# Patient Record
Sex: MALE | Race: WHITE | URBAN - METROPOLITAN AREA
[De-identification: names, ages, dates, MRNs, and addresses within clinical notes are randomized per-mention and may not be internally consistent; named-entity substitution may affect disease eponyms.]

---

## 2017-01-26 ENCOUNTER — OFFICE VISIT (OUTPATIENT)
Dept: ORTHOPEDIC SURGERY | Age: 42
End: 2017-01-26

## 2017-01-26 DIAGNOSIS — M25.532 WRIST PAIN, ACUTE, LEFT: Primary | ICD-10-CM

## 2017-01-26 PROCEDURE — 99213 OFFICE O/P EST LOW 20 MIN: CPT | Performed by: ORTHOPAEDIC SURGERY

## 2017-02-08 PROBLEM — M25.539 WRIST PAIN, ACUTE: Status: ACTIVE | Noted: 2017-02-08

## 2017-03-06 ENCOUNTER — TELEPHONE (OUTPATIENT)
Dept: ORTHOPEDIC SURGERY | Age: 42
End: 2017-03-06

## 2017-03-13 ENCOUNTER — OFFICE VISIT (OUTPATIENT)
Dept: ORTHOPEDIC SURGERY | Age: 42
End: 2017-03-13

## 2017-03-13 VITALS — HEIGHT: 70 IN | WEIGHT: 164.02 LBS | BODY MASS INDEX: 23.48 KG/M2

## 2017-03-13 DIAGNOSIS — M25.532 WRIST PAIN, ACUTE, LEFT: Primary | ICD-10-CM

## 2017-03-13 PROCEDURE — 99213 OFFICE O/P EST LOW 20 MIN: CPT | Performed by: ORTHOPAEDIC SURGERY

## 2017-04-05 ENCOUNTER — OFFICE VISIT (OUTPATIENT)
Dept: ORTHOPEDIC SURGERY | Age: 42
End: 2017-04-05

## 2017-04-05 VITALS
SYSTOLIC BLOOD PRESSURE: 108 MMHG | HEIGHT: 70 IN | DIASTOLIC BLOOD PRESSURE: 75 MMHG | HEART RATE: 75 BPM | BODY MASS INDEX: 23.48 KG/M2 | WEIGHT: 164 LBS

## 2017-04-05 DIAGNOSIS — M75.82 ROTATOR CUFF TENDONITIS, LEFT: ICD-10-CM

## 2017-04-05 DIAGNOSIS — M25.512 LEFT SHOULDER PAIN, UNSPECIFIED CHRONICITY: Primary | ICD-10-CM

## 2017-04-05 PROCEDURE — 20610 DRAIN/INJ JOINT/BURSA W/O US: CPT | Performed by: ORTHOPAEDIC SURGERY

## 2017-04-05 PROCEDURE — 99214 OFFICE O/P EST MOD 30 MIN: CPT | Performed by: ORTHOPAEDIC SURGERY

## 2017-04-05 RX ORDER — WITCH HAZEL 50 %
500 PADS, MEDICATED (EA) TOPICAL 3 TIMES DAILY
COMMUNITY

## 2017-04-05 RX ORDER — ACETAMINOPHEN 160 MG
TABLET,DISINTEGRATING ORAL
COMMUNITY

## 2017-04-19 ENCOUNTER — HOSPITAL ENCOUNTER (OUTPATIENT)
Dept: PHYSICAL THERAPY | Age: 42
Discharge: HOME | End: 2017-04-30
Admitting: ORTHOPAEDIC SURGERY

## 2017-05-03 ENCOUNTER — HOSPITAL ENCOUNTER (OUTPATIENT)
Dept: PHYSICAL THERAPY | Age: 42
Discharge: HOME | End: 2017-05-03
Admitting: ORTHOPAEDIC SURGERY

## 2017-05-10 ENCOUNTER — OFFICE VISIT (OUTPATIENT)
Dept: ORTHOPEDIC SURGERY | Age: 42
End: 2017-05-10

## 2017-05-10 VITALS
WEIGHT: 166 LBS | DIASTOLIC BLOOD PRESSURE: 85 MMHG | HEIGHT: 71 IN | HEART RATE: 65 BPM | SYSTOLIC BLOOD PRESSURE: 130 MMHG | BODY MASS INDEX: 23.24 KG/M2

## 2017-05-10 DIAGNOSIS — M75.82 ROTATOR CUFF TENDONITIS, LEFT: Primary | ICD-10-CM

## 2017-05-10 PROCEDURE — 99213 OFFICE O/P EST LOW 20 MIN: CPT | Performed by: ORTHOPAEDIC SURGERY

## 2017-05-22 ENCOUNTER — OFFICE VISIT (OUTPATIENT)
Dept: FAMILY MEDICINE CLINIC | Age: 42
End: 2017-05-22

## 2017-05-22 VITALS
OXYGEN SATURATION: 96 % | TEMPERATURE: 97.4 F | WEIGHT: 167.8 LBS | SYSTOLIC BLOOD PRESSURE: 112 MMHG | HEART RATE: 66 BPM | BODY MASS INDEX: 23.4 KG/M2 | DIASTOLIC BLOOD PRESSURE: 73 MMHG

## 2017-05-22 DIAGNOSIS — H81.11 BPPV (BENIGN PAROXYSMAL POSITIONAL VERTIGO), RIGHT: Primary | ICD-10-CM

## 2017-05-22 PROBLEM — H81.10 BPPV (BENIGN PAROXYSMAL POSITIONAL VERTIGO): Status: ACTIVE | Noted: 2017-05-22

## 2017-05-22 PROCEDURE — 99213 OFFICE O/P EST LOW 20 MIN: CPT | Performed by: FAMILY MEDICINE

## 2017-05-22 RX ORDER — MECLIZINE HYDROCHLORIDE 25 MG/1
TABLET ORAL
Qty: 24 TABLET | Refills: 0 | Status: SHIPPED | OUTPATIENT
Start: 2017-05-22 | End: 2017-08-31 | Stop reason: ALTCHOICE

## 2017-05-30 ENCOUNTER — TELEPHONE (OUTPATIENT)
Dept: FAMILY MEDICINE CLINIC | Age: 42
End: 2017-05-30

## 2017-07-28 ENCOUNTER — TELEPHONE (OUTPATIENT)
Dept: ORTHOPEDIC SURGERY | Age: 42
End: 2017-07-28

## 2017-08-28 ENCOUNTER — OFFICE VISIT (OUTPATIENT)
Dept: ORTHOPEDIC SURGERY | Age: 42
End: 2017-08-28

## 2017-08-28 VITALS — BODY MASS INDEX: 23.49 KG/M2 | WEIGHT: 167.77 LBS | HEIGHT: 71 IN

## 2017-08-28 DIAGNOSIS — M25.532 LEFT WRIST PAIN: Primary | ICD-10-CM

## 2017-08-28 PROCEDURE — 99213 OFFICE O/P EST LOW 20 MIN: CPT | Performed by: ORTHOPAEDIC SURGERY

## 2017-08-30 ENCOUNTER — TELEPHONE (OUTPATIENT)
Dept: ORTHOPEDIC SURGERY | Age: 42
End: 2017-08-30

## 2017-08-31 ENCOUNTER — OFFICE VISIT (OUTPATIENT)
Dept: FAMILY MEDICINE CLINIC | Age: 42
End: 2017-08-31

## 2017-08-31 VITALS
SYSTOLIC BLOOD PRESSURE: 125 MMHG | TEMPERATURE: 96.3 F | RESPIRATION RATE: 20 BRPM | WEIGHT: 171.5 LBS | OXYGEN SATURATION: 97 % | HEART RATE: 68 BPM | DIASTOLIC BLOOD PRESSURE: 77 MMHG | BODY MASS INDEX: 24.01 KG/M2

## 2017-08-31 DIAGNOSIS — S16.1XXA NECK STRAIN, INITIAL ENCOUNTER: ICD-10-CM

## 2017-08-31 DIAGNOSIS — M79.18 RHOMBOID MUSCLE PAIN: Primary | ICD-10-CM

## 2017-08-31 PROCEDURE — 99213 OFFICE O/P EST LOW 20 MIN: CPT | Performed by: FAMILY MEDICINE

## 2017-08-31 RX ORDER — CHOLECALCIFEROL (VITAMIN D3) 125 MCG
5 CAPSULE ORAL NIGHTLY
COMMUNITY

## 2017-08-31 RX ORDER — METHOCARBAMOL 500 MG/1
500 TABLET, FILM COATED ORAL 3 TIMES DAILY PRN
Qty: 30 TABLET | Refills: 0 | Status: SHIPPED | OUTPATIENT
Start: 2017-08-31 | End: 2017-09-10

## 2017-08-31 RX ORDER — METHYLPREDNISOLONE 4 MG/1
TABLET ORAL
Qty: 1 KIT | Refills: 0 | Status: SHIPPED | OUTPATIENT
Start: 2017-08-31 | End: 2017-09-06

## 2017-09-25 ENCOUNTER — OFFICE VISIT (OUTPATIENT)
Dept: ORTHOPEDIC SURGERY | Age: 42
End: 2017-09-25

## 2017-09-25 VITALS — BODY MASS INDEX: 24.01 KG/M2 | WEIGHT: 171.52 LBS | HEIGHT: 71 IN

## 2017-09-25 DIAGNOSIS — M77.8 LEFT WRIST TENDONITIS: ICD-10-CM

## 2017-09-25 DIAGNOSIS — M25.832 ULNAR ABUTMENT SYNDROME, LEFT: Primary | ICD-10-CM

## 2017-09-25 PROBLEM — M25.839: Status: ACTIVE | Noted: 2017-09-25

## 2017-09-25 PROCEDURE — 73120 X-RAY EXAM OF HAND: CPT | Performed by: ORTHOPAEDIC SURGERY

## 2017-09-25 PROCEDURE — 99213 OFFICE O/P EST LOW 20 MIN: CPT | Performed by: ORTHOPAEDIC SURGERY

## 2017-09-25 NOTE — MR AVS SNAPSHOT
After Visit Summary             Dudley Frias   2017 10:45 AM   Office Visit    Description:  Male : 1975   Provider:  Desire Bell MD   Department[facility]              Your Follow-Up and Future Appointments         Below is a list of your follow-up and future appointments. This may not be a complete list as you may have made appointments directly with providers that we are not aware of or your providers may have made some for you. Please call your providers to confirm appointments. It is important to keep your appointments. Please bring your current insurance card, photo ID, co-pay, and all medication bottles to your appointment. If self-pay, payment is expected at the time of service.            Information from Your Visit        Department     Name Address Phone Fax    [facility] [address]  [address] [facility] [address] [facility] [address] [place] [address] [phone]      You Were Seen for:         Comments    Ulnar abutment syndrome, left   [[id number]]         Vital Signs     Height Weight Body Mass Index Smoking Status          5' 10.87\" (1.8 m) 171 lb 8.3 oz (77.8 kg) 24.01 kg/m2 Former Smoker           Medications and Orders      Your Current Medications Are              melatonin 5 MG TABS tablet Take 5 mg by mouth nightly    Cholecalciferol (VITAMIN D3) 2000 UNITS CAPS Take by mouth    CLOMIPHENE CITRATE PO Take 25 mg by mouth daily    LevOCARNitine (L-CARNITINE) 500 MG TABS Take 500 mg by mouth 3 times daily    Testosterone 2 MG/24HR PT24 Place 2 mg onto the skin    ibuprofen (ADVIL;MOTRIN) 800 MG tablet Take 1 tablet by mouth every 8 hours as needed for Pain    fluticasone (FLONASE) 50 MCG/ACT nasal spray 1 spray by Nasal route daily      Allergies           No Known Allergies      We Ordered/Performed the following           XR HAND LEFT (2 VIEWS)     Comments:    [id number]    XR HAND RIGHT (2 VIEWS)     Comments:    [id number] with their own [employer] username and password and access your record to view the After Visit Summary. Additional Information  If you have questions, please contact the physician practice where you receive care. Remember, [employer] is NOT to be used for urgent needs. For medical emergencies, dial 911. For questions regarding your [employer] account call [phone]. If you have a clinical question, please call your doctor's office.

## 2017-09-27 ENCOUNTER — TELEPHONE (OUTPATIENT)
Dept: ORTHOPEDIC SURGERY | Age: 42
End: 2017-09-27

## 2017-10-15 NOTE — PROGRESS NOTES
and no ROM of other digits/hand There is not clinical evidence of skeletal deformity or mal-rotation.      Minimal tenderness along the ECU tendon sheath   Nontender throughout remainder of left wrist exam    Mild pain with ulnar deviation of wrist noted    Negative ECU Synergy test, pain with supination and wrist hyperextension along ECU tendon sheath     contralateral wrist : normal ROM without pain. No pain on palpation. No swelling. Normal strength.         Negative Tinel's left carpal tunnel and left cubital tunnel    Capillary refill brisk all fingers, symmetric  Gross sensation intact to light touch median/ulnar/radial nerves  Sensation intact to radial/ulnar aspect of fingertip        Radiology:    X-rays obtained and reviewed in office:  Views 2  Location right and left wrists, comparison views  Impression no acute fracture or dislocation. Lunate cystic degenerative changes in the left wrist are noted with ulnar positive variance 1-2 mm, nearly symmetrical to contralateral wrist. No additional distal radioulnar joint abnormality or degenerative changes. Additional Diagnostic Test Findings:  MRI left wrist without contrast 9/8/2017:  1. Moderate tendinopathy of the juxta styloid segment of the ECU involving about 2 cm length. No retracted tear identified. 2. Pseudocyst formation at the ulnar corner of the lunate may suggest ulnar lunate abutment. No TFCC tear or high-grade chondromalacia at the ulnocarpal joint.    3. Mild dorsal carpal capsulitis    As interpreted by [facility] radiologist and personally reviewed by me  Office Procedures:  Orders Placed This Encounter   Procedures    XR HAND RIGHT (2 VIEWS)     19236     Order Specific Question:   Reason for exam:     Answer:   Hand Pain    XR HAND LEFT (2 VIEWS)     [id number]     Order Specific Question:   Reason for exam:     Answer:   Wei Goins MD  Orthopaedic Surgeon, [address] [place] [address]

## 2017-10-16 ENCOUNTER — OFFICE VISIT (OUTPATIENT)
Dept: ORTHOPEDIC SURGERY | Age: 42
End: 2017-10-16

## 2017-10-16 VITALS — WEIGHT: 171.52 LBS | HEIGHT: 71 IN | BODY MASS INDEX: 24.01 KG/M2

## 2017-10-16 DIAGNOSIS — M25.522 LEFT ELBOW PAIN: Primary | ICD-10-CM

## 2017-10-16 PROCEDURE — G8420 CALC BMI NORM PARAMETERS: HCPCS | Performed by: ORTHOPAEDIC SURGERY

## 2017-10-16 PROCEDURE — 73080 X-RAY EXAM OF ELBOW: CPT | Performed by: ORTHOPAEDIC SURGERY

## 2017-10-16 PROCEDURE — G8427 DOCREV CUR MEDS BY ELIG CLIN: HCPCS | Performed by: ORTHOPAEDIC SURGERY

## 2017-10-16 PROCEDURE — 1036F TOBACCO NON-USER: CPT | Performed by: ORTHOPAEDIC SURGERY

## 2017-10-16 PROCEDURE — G8484 FLU IMMUNIZE NO ADMIN: HCPCS | Performed by: ORTHOPAEDIC SURGERY

## 2017-10-16 PROCEDURE — 99213 OFFICE O/P EST LOW 20 MIN: CPT | Performed by: ORTHOPAEDIC SURGERY

## 2017-11-11 NOTE — PROGRESS NOTES
Assessment: 26-year-old male presenting with multiple left upper extremity complaints  1. Left ulnar-sided wrist pain with ulnar abutment  2. Left C6 subacute/chronic cervical radiculopathy, possible cubital tunnel syndrome with ulnar neuritis and ulnar nerve subluxation at elbow    Treatment Plan: Regarding the patient's left wrist, he would like to continue with nonoperative treatment and activity modification at this time. He is actually here today to discuss his left elbow symptoms which she has had in the past. I do think that he has a component of ulnar neuritis in his elbow with symptomatic ulnar nerve subluxation. Previous electrodiagnostic study has not demonstrated any evidence of ulnar neuropathy but I do suspect likely a component of this. He has no weakness or priya numbness on his examination. My recommendations today consist of avoiding elbow hyperflexion in protecting his elbow with elbow pad as needed. We also discussed the possibility of surgical intervention for cubital tunnel release with possible and more likely anterior ulnar nerve transposition for his unstable ulnar nerve at the elbow. He is not interested in surgical intervention at this time and would like to continue with conservative treatment. I offered follow-up at the patient's convenience and he would like to call with any questions, concerns or changes in his symptoms    No Follow-up on file. History of Present Illness  Mr. Lauren Shaw is a 26-year-old male presenting with history of left medial elbow discomfort and tingling in left forearm. 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 We have spoken about treatment options and the patient has decided for nonoperative treatment at this time after discussing ulnar shortening osteotomy and wrist arthroscopy.   He is actually here today for a previous complaint that he has had of occasional tingling in his ulnar nerve distribution with elbow discomfort. He has noticed some of the symptoms over the last several months. Previous electrodiagnostic study has demonstrated chronic C6 radiculopathy but no evidence of ulnar neuropathy based on that study 1 year ago. He denies any weakness in his hand and no persistent numbness but rather a tingling in his ulnar nerve distribution. No new injuries or other events reported by the patient    Review of Systems  Pertinent items are noted in HPI  Review of systems reviewed from Patient History Form dated on 10/31/16 and available in the patient's chart under the Media tab. Vital Signs  There were no vitals filed for this visit. Physical Exam  Constitutional:  Patient is well-nourished and demonstrates normal hygiene. Mental Status:  Patient is alert and oriented to person, place and time. Skin:  Intact, no rashes or lesions. Musculoskeletal   Cervical spine, shoulders, elbows, digits: satisfactory pain-free range of motion,   strength, and stability     Left wrist(s):  Mild pain to palpation ulnar dorsal wrist, no discrete pain and ulnar fovea . Swelling is not present. Strength and ROM minimally limited by pain, symmetric range of motion of wrist compared to contralateral side. No pain or swelling and no ROM of other digits/hand There is not clinical evidence of skeletal deformity or mal-rotation.     Minimal tenderness along the ECU tendon sheath  No tenderness at the DRUJ and stability symmetric to the contralateral side  Range of motion of the wrist is 80° of extension and 80° of flexion, symmetrical to contralateral side  Mild pain with ulnar deviation and extension of the wrist elicited today  No pain throughout remainder of wrist exam  contralateral wrist : normal ROM without pain. No pain on palpation. No swelling.  Normal strength.      Left elbow:  Nontender to palpation throughout the elbow with range of motion painless pronation supination as well as flexion and extension  Ulnar nerve subluxation noted with  positive Tinel's with no radiating discomfort upon palpation  Neurological: Essentially baseline normal without recent change  5 out of 5 strength in motor from C5 to T1 bilateral upper extremity  Gross sensation intact to light touch median, ulnar, radial nerves. Negative Tinel's at Guyon's canal and left wrist  No intrinsic wasting or thenar atrophy    Capillary refill brisk all fingers, symmetric          Radiology:    X-rays obtained and reviewed in office:  Views 3  Location left elbow  Impression  no acute fracture or dislocation, no foreign body. No additional bony abnormalities noted. Additional Diagnostic Test Findings:    Office Procedures:  Orders Placed This Encounter   Procedures    XR ELBOW LEFT (MIN 3 VIEWS)     [id number]     Order Specific Question:   Reason for exam:     Answer:   Elbow Pain           Robina Peterson MD  Orthopaedic Surgeon, [address]    Contact Information:  Nic Hastings: [phone] [id number] Clinical )    This dictation was performed with a verbal recognition program [facility]) and it was checked for errors. It is possible that there are still dictated errors within this office note. If so, please bring any errors to my attention for an addendum. All efforts were made to ensure that this office note is accurate.

## 2017-11-14 ENCOUNTER — TELEPHONE (OUTPATIENT)
Dept: FAMILY MEDICINE CLINIC | Age: 42
End: 2017-11-14

## 2017-11-14 DIAGNOSIS — J98.01 BRONCHOSPASM: Primary | ICD-10-CM

## 2017-11-14 RX ORDER — ALBUTEROL SULFATE 90 UG/1
2 AEROSOL, METERED RESPIRATORY (INHALATION) EVERY 6 HOURS PRN
Qty: 1 INHALER | Refills: 3 | Status: SHIPPED | OUTPATIENT
Start: 2017-11-14

## 2017-11-14 NOTE — TELEPHONE ENCOUNTER
Patient requesting refill     Medication name:Albeturol  Medication dose:108 (90base)  Frequency:2 puff   Quantity:1 inhaler   [facility]  Pharmacy number:[phone]  Last ov:  Last labs:

## 2018-03-09 ENCOUNTER — TELEPHONE (OUTPATIENT)
Dept: FAMILY MEDICINE CLINIC | Age: 43
End: 2018-03-09

## 2018-03-09 DIAGNOSIS — J98.8 RESPIRATORY TRACT INFECTION: Primary | ICD-10-CM

## 2018-03-09 RX ORDER — AZITHROMYCIN 250 MG/1
TABLET, FILM COATED ORAL
Qty: 1 PACKET | Refills: 0 | Status: SHIPPED | OUTPATIENT
Start: 2018-03-09 | End: 2018-03-19

## 2018-03-09 NOTE — TELEPHONE ENCOUNTER
Discussed with patient he stated no fever or chills and his symptoms began 7 days ago. He has tried ibuprofen and tylenol with no relief.  Please advise

## 2018-03-09 NOTE — TELEPHONE ENCOUNTER
Pt cites cough, congestion, green sputum, and general bodyaches. Pt declined scheduling sick visit per office protocol. He states he has to work today and can't come in, is hoping to see if PCP Elvin will make an exception on his behalf and e-scribe an antibiotic to the below pharmacy. Does PCP feel this is appropriate?     Pharmacy: [facility] [address] [phone]

## 2019-08-08 ENCOUNTER — OFFICE VISIT (OUTPATIENT)
Dept: ORTHOPEDIC SURGERY | Age: 44
End: 2019-08-08
Payer: COMMERCIAL

## 2019-08-08 DIAGNOSIS — M25.832 ULNAR ABUTMENT SYNDROME OF LEFT WRIST: Primary | ICD-10-CM

## 2019-08-08 PROCEDURE — 20605 DRAIN/INJ JOINT/BURSA W/O US: CPT | Performed by: ORTHOPAEDIC SURGERY

## 2019-08-08 PROCEDURE — 99213 OFFICE O/P EST LOW 20 MIN: CPT | Performed by: ORTHOPAEDIC SURGERY

## 2019-10-24 ENCOUNTER — TELEPHONE (OUTPATIENT)
Dept: ORTHOPEDIC SURGERY | Age: 44
End: 2019-10-24